# Patient Record
Sex: FEMALE | Race: WHITE | NOT HISPANIC OR LATINO | URBAN - METROPOLITAN AREA
[De-identification: names, ages, dates, MRNs, and addresses within clinical notes are randomized per-mention and may not be internally consistent; named-entity substitution may affect disease eponyms.]

---

## 2017-03-15 ENCOUNTER — OFFICE (OUTPATIENT)
Dept: URBAN - METROPOLITAN AREA CLINIC 78 | Facility: CLINIC | Age: 27
End: 2017-03-15

## 2017-03-15 PROCEDURE — 00014: CPT

## 2017-07-12 ENCOUNTER — INPATIENT HOSPITAL (OUTPATIENT)
Dept: URBAN - METROPOLITAN AREA HOSPITAL 60 | Facility: HOSPITAL | Age: 27
End: 2017-07-12

## 2017-07-12 DIAGNOSIS — K51.818 OTHER ULCERATIVE COLITIS WITH OTHER COMPLICATION: ICD-10-CM

## 2017-07-12 DIAGNOSIS — R10.84 GENERALIZED ABDOMINAL PAIN: ICD-10-CM

## 2017-07-12 DIAGNOSIS — K62.5 HEMORRHAGE OF ANUS AND RECTUM: ICD-10-CM

## 2017-07-12 PROCEDURE — 99222 1ST HOSP IP/OBS MODERATE 55: CPT

## 2017-07-13 PROCEDURE — 99232 SBSQ HOSP IP/OBS MODERATE 35: CPT

## 2017-07-18 ENCOUNTER — OFFICE (OUTPATIENT)
Dept: URBAN - METROPOLITAN AREA CLINIC 33 | Facility: CLINIC | Age: 27
End: 2017-07-18

## 2017-07-18 VITALS
HEART RATE: 87 BPM | DIASTOLIC BLOOD PRESSURE: 81 MMHG | SYSTOLIC BLOOD PRESSURE: 109 MMHG | TEMPERATURE: 97.5 F | HEIGHT: 66 IN | WEIGHT: 130 LBS

## 2017-07-18 DIAGNOSIS — K51.80 OTHER ULCERATIVE COLITIS WITHOUT COMPLICATIONS: ICD-10-CM

## 2017-07-18 PROCEDURE — 99214 OFFICE O/P EST MOD 30 MIN: CPT

## 2017-07-18 RX ORDER — HYDROCORTISONE 100 MG/60ML
ENEMA RECTAL
Qty: 90 | Refills: 3 | Status: ACTIVE
Start: 2017-07-18

## 2017-07-18 RX ORDER — PROMETHAZINE HYDROCHLORIDE 25 MG/1
TABLET ORAL
Qty: 30 | Refills: 1 | Status: ACTIVE
Start: 2017-07-18

## 2017-11-09 ENCOUNTER — OFFICE (OUTPATIENT)
Dept: URBAN - METROPOLITAN AREA CLINIC 78 | Facility: CLINIC | Age: 27
End: 2017-11-09

## 2017-11-09 PROCEDURE — 00014: CPT

## 2019-03-25 ENCOUNTER — OFFICE (OUTPATIENT)
Dept: URBAN - METROPOLITAN AREA CLINIC 78 | Facility: CLINIC | Age: 29
End: 2019-03-25
Payer: MEDICAID

## 2019-03-25 VITALS
WEIGHT: 135 LBS | HEART RATE: 86 BPM | HEIGHT: 66 IN | DIASTOLIC BLOOD PRESSURE: 70 MMHG | TEMPERATURE: 96 F | SYSTOLIC BLOOD PRESSURE: 101 MMHG

## 2019-03-25 DIAGNOSIS — K59.1 FUNCTIONAL DIARRHEA: ICD-10-CM

## 2019-03-25 DIAGNOSIS — K92.1 MELENA: ICD-10-CM

## 2019-03-25 DIAGNOSIS — K51.80 OTHER ULCERATIVE COLITIS WITHOUT COMPLICATIONS: ICD-10-CM

## 2019-03-25 DIAGNOSIS — Z79.52 LONG TERM (CURRENT) USE OF SYSTEMIC STEROIDS: ICD-10-CM

## 2019-03-25 PROCEDURE — 99214 OFFICE O/P EST MOD 30 MIN: CPT

## 2019-03-25 NOTE — SERVICEHPINOTES
Ms. Bhat is a 28 yr old with pan UC per patient here to discuss symptoms. Per patient, she used to be a patient here but after developing symptoms from Remicade (neuropathy) and Humira (flu like symptoms/hair loss) she was referred to [facility] for a 2nd opinion. She has been following there for the past few years. She was on Entyvio for awhile but it "never worked". Most recently, she took Xeljanz for 8 weeks with no change in her symptoms. She notes that she has been steroid dependent for over 1 year taking between 20-40 mg of prednisone per day. She hasn't been steroid free for more than 1 week in the past one year. She has not been able to follow up with [facility] any longer as they no longer accept her insurance. She has been "struggling" with diarrhea, urgency, and rectal bleeding. When she takes steroids she has 3-4 BM per day. She was in the ER yesterday with the following pertinent results: ESR of 46, normal WBC, hgb of 9.6, hct of 31.2, K of 3.4, normal creatinine, mg level, and [facility] GI panel. C Diff was not checked. They started her on prednisone [address] scan shows mild mucosal wall thickening throughout the colon c/w pancolitis. No fever, chills, or vomiting. Patient notes abdominal pain but is smiling and non toxic appearing.